# Patient Record
Sex: MALE | Race: WHITE
[De-identification: names, ages, dates, MRNs, and addresses within clinical notes are randomized per-mention and may not be internally consistent; named-entity substitution may affect disease eponyms.]

---

## 2019-11-05 ENCOUNTER — HOSPITAL ENCOUNTER (INPATIENT)
Dept: HOSPITAL 1 - ED | Age: 74
LOS: 7 days | Discharge: NURSING, REHAB OR LONG-TERM CARE | DRG: 917 | End: 2019-11-12
Attending: INTERNAL MEDICINE | Admitting: INTERNAL MEDICINE
Payer: OTHER GOVERNMENT

## 2019-11-05 VITALS — SYSTOLIC BLOOD PRESSURE: 99 MMHG | DIASTOLIC BLOOD PRESSURE: 60 MMHG

## 2019-11-05 VITALS — BODY MASS INDEX: 27.28 KG/M2 | WEIGHT: 180 LBS | HEIGHT: 68 IN

## 2019-11-05 DIAGNOSIS — F43.10: ICD-10-CM

## 2019-11-05 DIAGNOSIS — F29: ICD-10-CM

## 2019-11-05 DIAGNOSIS — M62.82: ICD-10-CM

## 2019-11-05 DIAGNOSIS — G92: ICD-10-CM

## 2019-11-05 DIAGNOSIS — T50.911A: Primary | ICD-10-CM

## 2019-11-05 DIAGNOSIS — F03.91: ICD-10-CM

## 2019-11-05 LAB
ALBUMIN SERPL-MCNC: 3.4 G/DL (ref 3.4–5)
ALP SERPL-CCNC: 116 U/L (ref 46–116)
ALT SERPL-CCNC: 51 U/L (ref 16–63)
AMPHETAMINES UR QL SCN: (no result)
AST SERPL-CCNC: 42 U/L (ref 15–37)
BASOPHILS NFR BLD: 0.4 % (ref 0–2)
BILIRUB SERPL-MCNC: 0.42 MG/DL (ref 0.2–1)
BUN SERPL-MCNC: 16 MG/DL (ref 7–18)
CALCIUM SERPL-MCNC: 8.4 MG/DL (ref 8.5–10.1)
CHLORIDE SERPL-SCNC: 105 MMOL/L (ref 98–107)
CK MB SERPL-MCNC: 6.6 NG/ML (ref 0–3.6)
CK SERPL-CCNC: 348 U/L (ref 39–308)
CO2 SERPL-SCNC: 31.7 MMOL/L (ref 21–32)
CREAT SERPL-MCNC: 0.9 MG/DL (ref 0.7–1.3)
ERYTHROCYTE [DISTWIDTH] IN BLOOD BY AUTOMATED COUNT: 14.3 % (ref 11.5–14.5)
GFR SERPLBLD BASED ON 1.73 SQ M-ARVRAT: (no result) ML/MIN
GLUCOSE SERPL-MCNC: 112 MG/DL (ref 74–106)
MICROSCOPIC UR-IMP: NO
PLATELET # BLD: 179 X10^3MCL (ref 130–400)
POTASSIUM SERPL-SCNC: 3.9 MMOL/L (ref 3.5–5.1)
PROT SERPL-MCNC: 6.8 G/DL (ref 6.4–8.2)
RBC # UR STRIP.AUTO: NEGATIVE /UL
SODIUM SERPL-SCNC: 142 MMOL/L (ref 136–145)
UA SPECIFIC GRAVITY: 1.01 (ref 1–1.03)

## 2019-11-05 PROCEDURE — G0378 HOSPITAL OBSERVATION PER HR: HCPCS

## 2019-11-06 VITALS — SYSTOLIC BLOOD PRESSURE: 107 MMHG | DIASTOLIC BLOOD PRESSURE: 54 MMHG

## 2019-11-06 VITALS — DIASTOLIC BLOOD PRESSURE: 63 MMHG | SYSTOLIC BLOOD PRESSURE: 111 MMHG

## 2019-11-06 VITALS — SYSTOLIC BLOOD PRESSURE: 106 MMHG | DIASTOLIC BLOOD PRESSURE: 54 MMHG

## 2019-11-06 VITALS — SYSTOLIC BLOOD PRESSURE: 113 MMHG | DIASTOLIC BLOOD PRESSURE: 61 MMHG

## 2019-11-06 VITALS — DIASTOLIC BLOOD PRESSURE: 60 MMHG | SYSTOLIC BLOOD PRESSURE: 107 MMHG

## 2019-11-06 LAB
BASOPHILS NFR BLD: 0.6 % (ref 0–2)
BUN SERPL-MCNC: 12 MG/DL (ref 7–18)
CALCIUM SERPL-MCNC: 7.9 MG/DL (ref 8.5–10.1)
CHLORIDE SERPL-SCNC: 110 MMOL/L (ref 98–107)
CO2 SERPL-SCNC: 28.3 MMOL/L (ref 21–32)
CREAT SERPL-MCNC: 0.7 MG/DL (ref 0.7–1.3)
ERYTHROCYTE [DISTWIDTH] IN BLOOD BY AUTOMATED COUNT: 13.8 % (ref 11.5–14.5)
GFR SERPLBLD BASED ON 1.73 SQ M-ARVRAT: (no result) ML/MIN
GLUCOSE SERPL-MCNC: 107 MG/DL (ref 74–106)
MAGNESIUM SERPL-MCNC: 2.2 MG/DL (ref 1.8–2.4)
PHOSPHATE SERPL-MCNC: 2.6 MG/DL (ref 2.5–4.9)
PLATELET # BLD: 129 X10^3MCL (ref 130–400)
POTASSIUM SERPL-SCNC: 3.5 MMOL/L (ref 3.5–5.1)
SODIUM SERPL-SCNC: 144 MMOL/L (ref 136–145)

## 2019-11-07 VITALS — SYSTOLIC BLOOD PRESSURE: 91 MMHG | DIASTOLIC BLOOD PRESSURE: 47 MMHG

## 2019-11-07 VITALS — SYSTOLIC BLOOD PRESSURE: 100 MMHG | DIASTOLIC BLOOD PRESSURE: 57 MMHG

## 2019-11-07 VITALS — SYSTOLIC BLOOD PRESSURE: 122 MMHG | DIASTOLIC BLOOD PRESSURE: 60 MMHG

## 2019-11-07 VITALS — DIASTOLIC BLOOD PRESSURE: 58 MMHG | SYSTOLIC BLOOD PRESSURE: 118 MMHG

## 2019-11-07 VITALS — SYSTOLIC BLOOD PRESSURE: 112 MMHG | DIASTOLIC BLOOD PRESSURE: 56 MMHG

## 2019-11-07 VITALS — DIASTOLIC BLOOD PRESSURE: 56 MMHG | SYSTOLIC BLOOD PRESSURE: 109 MMHG

## 2019-11-07 LAB
BASOPHILS NFR BLD: 0.7 % (ref 0–2)
BUN SERPL-MCNC: 14 MG/DL (ref 7–18)
CALCIUM SERPL-MCNC: 7.9 MG/DL (ref 8.5–10.1)
CHLORIDE SERPL-SCNC: 109 MMOL/L (ref 98–107)
CO2 SERPL-SCNC: 26.9 MMOL/L (ref 21–32)
CREAT SERPL-MCNC: 0.8 MG/DL (ref 0.7–1.3)
ERYTHROCYTE [DISTWIDTH] IN BLOOD BY AUTOMATED COUNT: 13.7 % (ref 11.5–14.5)
GFR SERPLBLD BASED ON 1.73 SQ M-ARVRAT: (no result) ML/MIN
GLUCOSE SERPL-MCNC: 85 MG/DL (ref 74–106)
MAGNESIUM SERPL-MCNC: 2.1 MG/DL (ref 1.8–2.4)
PHOSPHATE SERPL-MCNC: 3.2 MG/DL (ref 2.5–4.9)
PLATELET # BLD: 128 X10^3MCL (ref 130–400)
POTASSIUM SERPL-SCNC: 3.2 MMOL/L (ref 3.5–5.1)
SODIUM SERPL-SCNC: 147 MMOL/L (ref 136–145)

## 2019-11-08 VITALS — SYSTOLIC BLOOD PRESSURE: 118 MMHG | DIASTOLIC BLOOD PRESSURE: 63 MMHG

## 2019-11-08 VITALS — SYSTOLIC BLOOD PRESSURE: 122 MMHG | DIASTOLIC BLOOD PRESSURE: 75 MMHG

## 2019-11-08 VITALS — DIASTOLIC BLOOD PRESSURE: 70 MMHG | SYSTOLIC BLOOD PRESSURE: 136 MMHG

## 2019-11-08 VITALS — DIASTOLIC BLOOD PRESSURE: 68 MMHG | SYSTOLIC BLOOD PRESSURE: 105 MMHG

## 2019-11-08 LAB
ALBUMIN SERPL-MCNC: 2.6 G/DL (ref 3.4–5)
ALP SERPL-CCNC: 91 U/L (ref 46–116)
ALT SERPL-CCNC: 38 U/L (ref 16–63)
AST SERPL-CCNC: 54 U/L (ref 15–37)
BASOPHILS NFR BLD: 0.5 % (ref 0–2)
BILIRUB SERPL-MCNC: 0.7 MG/DL (ref 0.2–1)
BUN SERPL-MCNC: 13 MG/DL (ref 7–18)
CALCIUM SERPL-MCNC: 7.6 MG/DL (ref 8.5–10.1)
CHLORIDE SERPL-SCNC: 108 MMOL/L (ref 98–107)
CO2 SERPL-SCNC: 29.2 MMOL/L (ref 21–32)
CREAT SERPL-MCNC: 0.8 MG/DL (ref 0.7–1.3)
ERYTHROCYTE [DISTWIDTH] IN BLOOD BY AUTOMATED COUNT: 13.8 % (ref 11.5–14.5)
GFR SERPLBLD BASED ON 1.73 SQ M-ARVRAT: (no result) ML/MIN
GLUCOSE SERPL-MCNC: 98 MG/DL (ref 74–106)
MAGNESIUM SERPL-MCNC: 2 MG/DL (ref 1.8–2.4)
PHOSPHATE SERPL-MCNC: 3.6 MG/DL (ref 2.5–4.9)
PLATELET # BLD: 135 X10^3MCL (ref 130–400)
POTASSIUM SERPL-SCNC: 3.6 MMOL/L (ref 3.5–5.1)
PROT SERPL-MCNC: 5.3 G/DL (ref 6.4–8.2)
SODIUM SERPL-SCNC: 144 MMOL/L (ref 136–145)

## 2019-11-09 VITALS — DIASTOLIC BLOOD PRESSURE: 57 MMHG | SYSTOLIC BLOOD PRESSURE: 112 MMHG

## 2019-11-09 VITALS — DIASTOLIC BLOOD PRESSURE: 57 MMHG | SYSTOLIC BLOOD PRESSURE: 109 MMHG

## 2019-11-09 VITALS — SYSTOLIC BLOOD PRESSURE: 120 MMHG | DIASTOLIC BLOOD PRESSURE: 66 MMHG

## 2019-11-09 VITALS — SYSTOLIC BLOOD PRESSURE: 135 MMHG | DIASTOLIC BLOOD PRESSURE: 63 MMHG

## 2019-11-09 LAB
BASOPHILS NFR BLD: 0.5 % (ref 0–2)
BUN SERPL-MCNC: 7 MG/DL (ref 7–18)
CALCIUM SERPL-MCNC: 7.9 MG/DL (ref 8.5–10.1)
CHLORIDE SERPL-SCNC: 108 MMOL/L (ref 98–107)
CO2 SERPL-SCNC: 27.6 MMOL/L (ref 21–32)
CREAT SERPL-MCNC: 0.7 MG/DL (ref 0.7–1.3)
ERYTHROCYTE [DISTWIDTH] IN BLOOD BY AUTOMATED COUNT: 13.6 % (ref 11.5–14.5)
GFR SERPLBLD BASED ON 1.73 SQ M-ARVRAT: (no result) ML/MIN
GLUCOSE SERPL-MCNC: 105 MG/DL (ref 74–106)
PLATELET # BLD: 124 X10^3MCL (ref 130–400)
POTASSIUM SERPL-SCNC: 3.1 MMOL/L (ref 3.5–5.1)
SODIUM SERPL-SCNC: 146 MMOL/L (ref 136–145)

## 2019-11-10 VITALS — SYSTOLIC BLOOD PRESSURE: 116 MMHG | DIASTOLIC BLOOD PRESSURE: 72 MMHG

## 2019-11-10 VITALS — DIASTOLIC BLOOD PRESSURE: 72 MMHG | SYSTOLIC BLOOD PRESSURE: 116 MMHG

## 2019-11-10 VITALS — DIASTOLIC BLOOD PRESSURE: 71 MMHG | SYSTOLIC BLOOD PRESSURE: 138 MMHG

## 2019-11-10 VITALS — SYSTOLIC BLOOD PRESSURE: 116 MMHG | DIASTOLIC BLOOD PRESSURE: 59 MMHG

## 2019-11-10 LAB
BASOPHILS NFR BLD: 0.6 % (ref 0–2)
BUN SERPL-MCNC: 10 MG/DL (ref 7–18)
CALCIUM SERPL-MCNC: 8 MG/DL (ref 8.5–10.1)
CHLORIDE SERPL-SCNC: 106 MMOL/L (ref 98–107)
CO2 SERPL-SCNC: 30.5 MMOL/L (ref 21–32)
CREAT SERPL-MCNC: 0.8 MG/DL (ref 0.7–1.3)
ERYTHROCYTE [DISTWIDTH] IN BLOOD BY AUTOMATED COUNT: 13.8 % (ref 11.5–14.5)
GFR SERPLBLD BASED ON 1.73 SQ M-ARVRAT: (no result) ML/MIN
GLUCOSE SERPL-MCNC: 85 MG/DL (ref 74–106)
PLATELET # BLD: 133 X10^3MCL (ref 130–400)
POTASSIUM SERPL-SCNC: 3.5 MMOL/L (ref 3.5–5.1)
SODIUM SERPL-SCNC: 143 MMOL/L (ref 136–145)

## 2019-11-11 VITALS — DIASTOLIC BLOOD PRESSURE: 64 MMHG | SYSTOLIC BLOOD PRESSURE: 98 MMHG

## 2019-11-11 VITALS — SYSTOLIC BLOOD PRESSURE: 123 MMHG | DIASTOLIC BLOOD PRESSURE: 71 MMHG

## 2019-11-11 VITALS — SYSTOLIC BLOOD PRESSURE: 123 MMHG | DIASTOLIC BLOOD PRESSURE: 70 MMHG

## 2019-11-11 VITALS — SYSTOLIC BLOOD PRESSURE: 95 MMHG | DIASTOLIC BLOOD PRESSURE: 52 MMHG

## 2019-11-12 VITALS — SYSTOLIC BLOOD PRESSURE: 126 MMHG | DIASTOLIC BLOOD PRESSURE: 78 MMHG

## 2019-11-12 VITALS — DIASTOLIC BLOOD PRESSURE: 60 MMHG | SYSTOLIC BLOOD PRESSURE: 122 MMHG

## 2019-11-12 VITALS — SYSTOLIC BLOOD PRESSURE: 105 MMHG | DIASTOLIC BLOOD PRESSURE: 62 MMHG

## 2019-11-12 LAB
BASOPHILS NFR BLD: 0.9 % (ref 0–2)
BUN SERPL-MCNC: 14 MG/DL (ref 7–18)
CALCIUM SERPL-MCNC: 8.4 MG/DL (ref 8.5–10.1)
CHLORIDE SERPL-SCNC: 104 MMOL/L (ref 98–107)
CO2 SERPL-SCNC: 30 MMOL/L (ref 21–32)
CREAT SERPL-MCNC: 0.8 MG/DL (ref 0.7–1.3)
ERYTHROCYTE [DISTWIDTH] IN BLOOD BY AUTOMATED COUNT: 13.3 % (ref 11.5–14.5)
GFR SERPLBLD BASED ON 1.73 SQ M-ARVRAT: (no result) ML/MIN
GLUCOSE SERPL-MCNC: 94 MG/DL (ref 74–106)
PLATELET # BLD: 152 X10^3MCL (ref 130–400)
POTASSIUM SERPL-SCNC: 3.7 MMOL/L (ref 3.5–5.1)
SODIUM SERPL-SCNC: 142 MMOL/L (ref 136–145)

## 2020-04-05 ENCOUNTER — HOSPITAL ENCOUNTER (INPATIENT)
Dept: HOSPITAL 1 - ED | Age: 75
LOS: 12 days | Discharge: HOSPICE | DRG: 884 | End: 2020-04-17
Attending: HOSPITALIST | Admitting: HOSPITALIST
Payer: OTHER GOVERNMENT

## 2020-04-05 VITALS
HEIGHT: 70 IN | BODY MASS INDEX: 32.93 KG/M2 | WEIGHT: 230 LBS | BODY MASS INDEX: 32.93 KG/M2 | HEIGHT: 70 IN | WEIGHT: 230 LBS

## 2020-04-05 DIAGNOSIS — N17.0: ICD-10-CM

## 2020-04-05 DIAGNOSIS — Z79.82: ICD-10-CM

## 2020-04-05 DIAGNOSIS — M62.82: ICD-10-CM

## 2020-04-05 DIAGNOSIS — Z79.1: ICD-10-CM

## 2020-04-05 DIAGNOSIS — F03.91: Primary | ICD-10-CM

## 2020-04-05 DIAGNOSIS — F05: ICD-10-CM

## 2020-04-05 DIAGNOSIS — F29: ICD-10-CM

## 2020-04-05 DIAGNOSIS — E87.6: ICD-10-CM

## 2020-04-05 LAB
ALBUMIN SERPL-MCNC: 3.4 G/DL (ref 3.4–5)
ALP SERPL-CCNC: 80 U/L (ref 46–116)
ALT SERPL-CCNC: 27 U/L (ref 16–63)
AMPHETAMINES UR QL SCN: (no result)
AST SERPL-CCNC: 28 U/L (ref 15–37)
BASOPHILS NFR BLD: 0.8 % (ref 0–2)
BILIRUB SERPL-MCNC: 0.4 MG/DL (ref 0.2–1)
BUN SERPL-MCNC: 19 MG/DL (ref 7–18)
CALCIUM SERPL-MCNC: 8.8 MG/DL (ref 8.5–10.1)
CHLORIDE SERPL-SCNC: 105 MMOL/L (ref 98–107)
CO2 SERPL-SCNC: 29 MMOL/L (ref 21–32)
CREAT SERPL-MCNC: 1 MG/DL (ref 0.7–1.3)
ERYTHROCYTE [DISTWIDTH] IN BLOOD BY AUTOMATED COUNT: 13.6 % (ref 11.5–14.5)
GFR SERPLBLD BASED ON 1.73 SQ M-ARVRAT: (no result) ML/MIN
GLUCOSE SERPL-MCNC: 104 MG/DL (ref 74–106)
LIPASE SERPL-CCNC: 122 IU/L (ref 73–393)
MICROSCOPIC UR-IMP: YES
PLATELET # BLD: 184 X10^3MCL (ref 130–400)
POTASSIUM SERPL-SCNC: 3.3 MMOL/L (ref 3.5–5.1)
PROT SERPL-MCNC: 6.3 G/DL (ref 6.4–8.2)
RBC # UR STRIP.AUTO: (no result) /UL
SODIUM SERPL-SCNC: 144 MMOL/L (ref 136–145)
UA SPECIFIC GRAVITY: >=1.03 (ref 1–1.03)

## 2020-04-05 PROCEDURE — G0378 HOSPITAL OBSERVATION PER HR: HCPCS

## 2020-04-05 PROCEDURE — G0480 DRUG TEST DEF 1-7 CLASSES: HCPCS

## 2020-04-05 NOTE — NUR
PT RESTING IN BED IN A POSITION OF COMFORT. RESPIRATIONS EVEN AND UNLABORED, NO
S/S OF DISTRESS NOTED.

## 2020-04-05 NOTE — NUR
SPOKE TO PTS SONKAVYA. SON REPORTS THAT TWO WEEKS AGO PT HAD AN ADJUSTMENT TO
HIS MEDICATIONS WHICH INCREASED DOSAGE DUE TO AGITATION AND COMBATIVNESS WITH
STAFF MEMBERS. SINCE MEDICATION ADJUSTMENT PT SEEMS MORE "OUT OF IT" PER SON.
SON, KAVYA PHONE NUMBER- [phone]
PLS CALL WITH UPDATES.

## 2020-04-05 NOTE — NUR
PT BIB [facility] WITH C/O "INCREASED AGITATION" PER NURSING FACILITY
STAFF. PER MEDICS PT LIVES ON A DEMENTIA UNIT AND OVER THE LAST TWO DAYS PT HAS
EXPERIENCED AGITATION AND AGGRESSION TOWARDS STAFF MEMBERS WHICH IS UNUSUAL FOR
PT BEHAVIOR. PT HAS A HX OF ADVANCED DEMENTIA WITH A BASELINE MENTATION OF
A&OX1. PT ALSO HAS PTSD. PD ON SCENE AND PER MEDICS PT COOPERATIVE WITH ALL EMS
STAFF. PT IS CURRENTLY RESTING IN A POSITION OF COMFORT, COOPERATIVE, AND CALM.
PT PLACED ON FULL CARDIAC MONITOR, A&OX1, NO DISTRESS NOTED. PT IN VIEW OF
NURSES STATION FOR SAFETY. DR RYAN AT BEDSIDE FOR MSE.

## 2020-04-06 VITALS — DIASTOLIC BLOOD PRESSURE: 67 MMHG | SYSTOLIC BLOOD PRESSURE: 122 MMHG

## 2020-04-06 VITALS — DIASTOLIC BLOOD PRESSURE: 89 MMHG | SYSTOLIC BLOOD PRESSURE: 118 MMHG

## 2020-04-06 VITALS — DIASTOLIC BLOOD PRESSURE: 65 MMHG | SYSTOLIC BLOOD PRESSURE: 99 MMHG

## 2020-04-06 LAB
BASOPHILS NFR BLD: 0.6 % (ref 0–2)
BUN SERPL-MCNC: 15 MG/DL (ref 7–18)
CALCIUM SERPL-MCNC: 8.4 MG/DL (ref 8.5–10.1)
CHLORIDE SERPL-SCNC: 106 MMOL/L (ref 98–107)
CHOLEST SERPL-MCNC: 149 MG/DL (ref ?–200)
CHOLEST/HDLC SERPL: 2.2 MG/DL
CO2 SERPL-SCNC: 26.6 MMOL/L (ref 21–32)
CREAT SERPL-MCNC: 0.9 MG/DL (ref 0.7–1.3)
ERYTHROCYTE [DISTWIDTH] IN BLOOD BY AUTOMATED COUNT: 13.5 % (ref 11.5–14.5)
GFR SERPLBLD BASED ON 1.73 SQ M-ARVRAT: (no result) ML/MIN
GLUCOSE SERPL-MCNC: 102 MG/DL (ref 74–106)
HDLC SERPL-MCNC: 67 MG/DL (ref 40–60)
MAGNESIUM SERPL-MCNC: 2.1 MG/DL (ref 1.8–2.4)
PHOSPHATE SERPL-MCNC: 2.5 MG/DL (ref 2.5–4.9)
PLATELET # BLD: 122 X10^3MCL (ref 130–400)
POTASSIUM SERPL-SCNC: 3.2 MMOL/L (ref 3.5–5.1)
SODIUM SERPL-SCNC: 144 MMOL/L (ref 136–145)
TRIGL SERPL-MCNC: 34 MG/DL (ref ?–150)

## 2020-04-06 NOTE — NUR
PT IN BED WITH 1:1 SITTER AT BEDSIDE. PT IS STILL REFUSING TELE, TELE #16. PT
REMAINS UNCOOPERATIVE. EVEN AND UNLABORED BREATHING, ROOM AIR. FALL
PRECAUTIONS IN PLACE, ASPIRATION PRECAUTIONS IN PLACE. IV SALINE LOCK, RAC
20G. BED IN LOWEST POSITION, CALL LIGHT WITHIN REACH. WILL CONTINUE TO
MONITOR.

## 2020-04-06 NOTE — NUR
PT RESTING WITH EYES CLOSED. EASILY AROUSABLE WITH VERBAL STIMULI. CONFUSED.
WOULD BE DEFENSIVE AND THEN GO BACK TO SLEEP. MIREYA SOFT WRIST RESTRAINTS NOTED.
PT TOLERATED IT WELL. DENIES DIZZINESS AND HEADACHE. BREATH SOUNDS CLEAR.
BREATHING EVEN AND UNLABORED ON ROOM AIR. DENIES CHEST PAIN AND PRESSURE.
BOWEL SOUNDS ACTIVE. NO C/O N/V AND ABD PAIN. IV SALINE LOCK INTACT ON THE
RAC. MADE PT COMFORTABLE. PLACED CALL LIGHT WITH IN REACH. WILL CONTINUE TO
MONITOR.

## 2020-04-06 NOTE — NUR
AT 0715 - RECEIVED PATIENT FROM NIGHT NURSE. AWAKE, ALERT AND CONFUSED.
UNCOOPERATIVE, RESISTIVE TO TREATMENT AND COMBATIVE. SITTER IN ROOM WITH
PATIENT. PATIENT IS IN MIREYA WRIST RESTRAINTS FOR RISK OF INJURY TO SELF AND
OTHERS.
AT 0930 - PATIENT TOOK DEPAKOTE PO BUT AFTER THAT REFUSED ALL OTHER MEDS. HE
WAS SPITTING THEM OUT OF THE MOUTH.
AT 0950 - COMMENCED IV 20 MEQ K-RIDER. PATIENT BED LINEN CHANGED WITH 3 PERSON
ASSIST. REPOSITIONED PATIENT, BUT PATIENT HANGS LEGS OUT OF BED AND IS IN WHAT
APPEARS TO BE AN UNCOMFORTABLE POSITION MOST OF THE TIME. VERY RESISTIVE TO
 ALL CARE.
AT 1015 - PATIENT'S SON PER PHONE. UPDATED ON PATIENT CONDITION. SON IS AWARE
OF PATIENT'S MENTAL STATUS AND UNCOOPERATIVE AND COMBATIVE BEHAVIOUR.
AT 1145 - SEEN BY DR ALAN AND DR MUSTAFA DURING MORNING ROUNDS. DR MUSTAFA WILL
ORDER  IV OR IM MEDS INSTEAD OF PO.
CARE

## 2020-04-06 NOTE — NUR
RECEIVED CALL FROM NURSE CIUVEDA FROM PATIENT'S [facility]AC[facility]ITY. UPDATED. NO NEW
INFORMATION REGARDING TREATMENT IS AVAILABLE AT THIS TIME.

## 2020-04-06 NOTE — NUR
PT PULLING AT IV, TELE MONITOR, AND CONTINUOUSLY ATTEMPTING TO GET OUT OF BED.
PT UNCOOPERATIVE AND AGGRESSIVE AT THIS TIME. PLACED PT IN SOFT WRIST
RESTRAINTS TO B/L WRISTS. SITTER AT BEDSIDE.  ORDER FOR RESTRAINTS
RECEIVED. WILL CONTINUE TO MONITOR.

## 2020-04-06 NOTE — NUR
RECEIVED CALL FROM PATIENT'S SON. UPDATED ON CONDITION. PATIENT APPEARS A
LITTLE LESS COMBATIVE THIS AFTERNOON. HAS TAKEN RESPIDAL AS PER EMAR.

## 2020-04-06 NOTE — NUR
AT 1453 - GIVEN DOSE OF GEODON IM AS PER EMAR. PATIENT REMAINS COMBATIVE
DURING ATTEMPTS AT ANY CARE.
AT 1555 - REMAINS IN BED. AWAKE, CONFUSED. TALKS TO HIMSELF. OBSERVING PATIENT
CLOSELY.

## 2020-04-06 NOTE — NUR
PATIENT HAS EATEN SOME DINNER. STIL DIFFICULT TO POSITION CORRECTLY IN BED.
PATIENT REPEATEDLY GETS INTO A DIAGONAL POSITION DESPITE ALL EFFORTS OF
CORRECTION.

## 2020-04-06 NOTE — NUR
RECEIVED PT FROM ED NURSE. PT AWAKE, DISORIENTED, ATTEMPTING TO LEAVE.
UNCOOPERATIVE WITH CARE.  DENIES CP/PRESSURE. NO SOB/DIFFICULTY BREATHING
NOTED. PT AMBULATORY. IV TO RAC, INTACT AND PATENT. BED IN LOWEST POSITION.
CALL LIGHT WITHIN REACH. SITTER AT BEDSIDE.

## 2020-04-06 NOTE — NUR
PT TRANSFERED TO TELEMTRY ROOM 250B VIA WHEELCHAIR WITH SHELLIE HOOK AND LILLIAN
RN. PT AMBULATED WITH STEADY GAIT FROM WHEELCHAIR TO BED. PT STARTED TO
BECOME MILDLY AGITATED IN ROOM 250B. PREETHI CARBAJAL AT BEDSIDE, BEN AVELAR AT
BEDSIDE. PT AWAKE AND ALERT AT THIS TIME. BREATHING EVEN AND UNLABORED. STAFF
AT BEDSIDE ASSISTING PT AT THIS TIME.

## 2020-04-07 VITALS — SYSTOLIC BLOOD PRESSURE: 131 MMHG | DIASTOLIC BLOOD PRESSURE: 82 MMHG

## 2020-04-07 VITALS — SYSTOLIC BLOOD PRESSURE: 136 MMHG | DIASTOLIC BLOOD PRESSURE: 87 MMHG

## 2020-04-07 VITALS — DIASTOLIC BLOOD PRESSURE: 77 MMHG | SYSTOLIC BLOOD PRESSURE: 135 MMHG

## 2020-04-07 VITALS — SYSTOLIC BLOOD PRESSURE: 124 MMHG | DIASTOLIC BLOOD PRESSURE: 77 MMHG

## 2020-04-07 VITALS — DIASTOLIC BLOOD PRESSURE: 67 MMHG | SYSTOLIC BLOOD PRESSURE: 114 MMHG

## 2020-04-07 LAB
ALBUMIN SERPL-MCNC: 3.2 G/DL (ref 3.4–5)
ALP SERPL-CCNC: 85 U/L (ref 46–116)
ALT SERPL-CCNC: 30 U/L (ref 16–63)
AST SERPL-CCNC: 60 U/L (ref 15–37)
BASOPHILS NFR BLD: 0.5 % (ref 0–2)
BILIRUB SERPL-MCNC: 0.75 MG/DL (ref 0.2–1)
BUN SERPL-MCNC: 13 MG/DL (ref 7–18)
CALCIUM SERPL-MCNC: 8.5 MG/DL (ref 8.5–10.1)
CHLORIDE SERPL-SCNC: 104 MMOL/L (ref 98–107)
CO2 SERPL-SCNC: 32.2 MMOL/L (ref 21–32)
CREAT SERPL-MCNC: 1 MG/DL (ref 0.7–1.3)
ERYTHROCYTE [DISTWIDTH] IN BLOOD BY AUTOMATED COUNT: 13.8 % (ref 11.5–14.5)
GFR SERPLBLD BASED ON 1.73 SQ M-ARVRAT: (no result) ML/MIN
GLUCOSE SERPL-MCNC: 130 MG/DL (ref 74–106)
MAGNESIUM SERPL-MCNC: 2.1 MG/DL (ref 1.8–2.4)
PHOSPHATE SERPL-MCNC: 3.2 MG/DL (ref 2.5–4.9)
PLATELET # BLD: 203 X10^3MCL (ref 130–400)
POTASSIUM SERPL-SCNC: 3.6 MMOL/L (ref 3.5–5.1)
PROT SERPL-MCNC: 6.3 G/DL (ref 6.4–8.2)
SODIUM SERPL-SCNC: 143 MMOL/L (ref 136–145)

## 2020-04-07 NOTE — NUR
PT RESTING WITH EYES CLOSED. OCCASIONALLY WAKES UP AND PULLS THE TELE MONITOR.
MADE PT COMFORTABLE. WILL CONTINUE TO MONITOR.

## 2020-04-07 NOTE — NUR
PATIENT LAYING IN BED ALERT BUT NOT ORIENTED WITH BILATERAL WRIST RESTRAINTS
IN PLACE, CAP REFILL < 3 SECONDS, RADIAL PULSES STRONG, SKIN INTACT, PATIENT
INCONTINENT OF BLADDER AND BOWEL, BOWEL SOUNDS ACTIVE, PATIENT UNBALE TO
AMBULATE SAFELY DUE TO CONFUSION, LUNG SOUNDS CLEAR ON ROOM AIR, ALL OHER
NEEDS BEING MET AT THIS TIME.

## 2020-04-07 NOTE — NUR
PT IS ALERT TO SELF, CONFUSED AND UNABLE TO FOLLOW COMMANDS. TELE #16, NSR.
NO SIGN OF DISTRESS NOTED. PULSES ARE PRESENT. NO EDEMA NOTED. PT ON MIREYA SOFT
WRIST RESTRAINTS, PT IS KICKING, RESTLESS AND REMOVE LINES. MD ORDER IN FOR
RESTRAINTS IN CHART. LUNGS CLEAR IN ALL FEILDS. ON RA, NO SIGN OF RESP
DISTRESS. BOWEL SOUNDS ARE PRSENT. INCONTINENT WITH URINE. SKIN WARM AND
INTACT. NO SIGNS OF PAIN NOTED. IV ON RAC, SALINE LOCK INTACT AND SECURED. BED
IS AT LOWEST SETTING. CALL LIGHT WITHIN REACH. WILL CONTINUE TO MONITOR.

## 2020-04-07 NOTE — NUR
MD BONDS MADE AWARE OF VALPROIC ACID RESULTS AND ABOUT MD MORA WANTED A
HIGHER DOSE OF DEAKENE. NO CHRAGE IN DOSE RECIEVED. PER MD BONDS GIVEN
SCHEDULED DOSE AS IS TONIGHT.

## 2020-04-07 NOTE — NUR
PATIENT LAYING IN BED ALERT AND ORIENTED, SKIN INTACT NO C/O PAIN,LUNG SOUNDS
CLEAR ON RA, BOWLE SOUNDS ACTIVE, NO EDEMA NOTED, PEDAL PULSES STRONG AND
EVEN, SALIN LOCK ON RIGHT HAND INTACT, PATIENT ABLE TO AMBULATE AND MOVE
EXTREMETIES FREELY WITH NO C/O WEAKNESS PATIENT PLEASANT AND COOPERATIVE.

## 2020-04-07 NOTE — NUR
PT QUIET BUT OCCASIONALLY TRYING TO GET OUT OF BED AND KICK PEOPLE COMING NEAR
HIS BED. MIREYA WRIST RESTRAINTS STILL ON. PT TOLERATING IT WELL. WILL ENDORSE TO
THE AM NURSE ACCORDINGLY.

## 2020-04-07 NOTE — NUR
PATIENT LAYING IN BED ASLEEP WITH NO S/SX OF PAIN OR DISCOMFORT AT THIS TIME.
WILL ENDORSE CARE TO PM NURSE

## 2020-04-08 VITALS — SYSTOLIC BLOOD PRESSURE: 116 MMHG | DIASTOLIC BLOOD PRESSURE: 68 MMHG

## 2020-04-08 VITALS — SYSTOLIC BLOOD PRESSURE: 129 MMHG | DIASTOLIC BLOOD PRESSURE: 73 MMHG

## 2020-04-08 VITALS — SYSTOLIC BLOOD PRESSURE: 116 MMHG | DIASTOLIC BLOOD PRESSURE: 52 MMHG

## 2020-04-08 VITALS — SYSTOLIC BLOOD PRESSURE: 122 MMHG | DIASTOLIC BLOOD PRESSURE: 67 MMHG

## 2020-04-08 VITALS — DIASTOLIC BLOOD PRESSURE: 64 MMHG | SYSTOLIC BLOOD PRESSURE: 118 MMHG

## 2020-04-08 VITALS — SYSTOLIC BLOOD PRESSURE: 116 MMHG | DIASTOLIC BLOOD PRESSURE: 66 MMHG

## 2020-04-08 LAB
BASOPHILS NFR BLD: 0.5 % (ref 0–2)
BUN SERPL-MCNC: 12 MG/DL (ref 7–18)
CALCIUM SERPL-MCNC: 8.8 MG/DL (ref 8.5–10.1)
CHLORIDE SERPL-SCNC: 105 MMOL/L (ref 98–107)
CO2 SERPL-SCNC: 30.3 MMOL/L (ref 21–32)
CREAT SERPL-MCNC: 0.9 MG/DL (ref 0.7–1.3)
ERYTHROCYTE [DISTWIDTH] IN BLOOD BY AUTOMATED COUNT: 13.6 % (ref 11.5–14.5)
GFR SERPLBLD BASED ON 1.73 SQ M-ARVRAT: (no result) ML/MIN
GLUCOSE SERPL-MCNC: 97 MG/DL (ref 74–106)
PLATELET # BLD: 124 X10^3MCL (ref 130–400)
POTASSIUM SERPL-SCNC: 4 MMOL/L (ref 3.5–5.1)
SODIUM SERPL-SCNC: 144 MMOL/L (ref 136–145)

## 2020-04-08 NOTE — NUR
PT AGRESSIVE THIS AM. TRYING TO HIT CNA DURING ADLS FOR THE AM. PT CALMED DOWN
ENOUGH TO PREFORM ADLS. ON MIREYA SOFT WRIST RESTRAINTS, RENEWED ORDER IN PT
CHART. BED IS AT LOWEST SETTING. CALL LIGHT WITHIN REACH. WILL ENDORSE TO AM
NURSE.

## 2020-04-08 NOTE — NUR
PT IS STILL KICKING HIS LEGS IN BED. PT RESTS IN INTERVALS. SOFT RESTRAINTS ON
MIREYA WRISTS. ATTEMPTED TO REPOSITIONED PT BUT PT KEPT MOVING INTO A DIAGONAL
POSITION. BED IS AT LOWEST SETTING. CALL LIGHT WITHIN REACH. WILL CONTINUE TO
MONTIOR.

## 2020-04-08 NOTE — NUR
ROUTINE MEDICATIONS ADMINISTERED AND TOLERATED WELL. NO ACUTE DISTRESS NOTED.
PT CONFUSED, AGITATED, RESTLESS AND KICKING. HALDOL IM ADMINISTERED
ACCORDINGLY PER MAR ORDER. SITTER AT BEDSIDE. RESTRAINT PROTOCOLS FOLLOWED.
WILL CONTINUE TO MONITOR.

## 2020-04-08 NOTE — NUR
PT RESTLESS, KICKING. RESTRAINT PROTOCOLS FOLLOWED. SITTER AT BEDSIDE. BED IN
LOWEST POSITION. CALL LIGHT WITHIN REACH. WILL CONTINUE MONITOR.

## 2020-04-08 NOTE — NUR
HAVING A REGULAR DIET BREAKFAST ASSISTED BY CNA NO ASPIRATION NOTED. AM
SCHEDULED MEDS GIVEN, TOLERATED WELL. SOME VERBAL RESPONDED BUT CONFUSED.

## 2020-04-08 NOTE — NUR
SEEN IN BED AWAKE, CONFUSED, ATTEMPTING TO GET OUT OF RESTRAINT, KICKING HIS
LEGS, HAS EYES CONTACT, NON VERBAL RESPONDED AT THIS TIME. ON TELE#16 NSR.
BREATHING E/U ON ROOM AIR. CLEAR LUNG SOUND BILATERALLY. INCONTINENCE URINE.
S/L TO RAC INTACT AND PATENT. ORIENTATION PROVIDED, NO INDICATION OF LEARNING
AT THIS TIME. SITTER 1:1 AT BEDSIDE FOR SAFETY. BED IN LOWEST POSITION AND
LOCKED. SIDERAILS UP X2.

## 2020-04-08 NOTE — NUR
RECEIVED PT LAYING IN BED WITH HOB ELEVATED. PT IS CONFUSED. SITTER AT
BEDSIDE FOR SAFETY. ON TELE #16 READING NSR. BREATHING IS EVEN AND UNLABORED
ON RA. LUNG SOUNDS CTA. NO RESP DISTRESS NOTED. INCONTINENT. WILL PROVIDE BENITO
CARE PRN AND REPOSITION Q2H. BILATERALLY SOFT RESTRAINTS APPLIED, WILL FOLLOW
RESTRAINT PROTOCOL. SKIN INTACT. IV TO RAC SL, PATENT AND INTACT. NO ERYTHEMA
NOTED. BED IN LOWEST POSITION. CALL LIGHT WITHIN REACH. BED ALARM ON. WILL
CONTINUE TO MONITOR.

## 2020-04-08 NOTE — NUR
AGITATED NOTED AT THIS TIME. FINISHED 50% OF DINNER. REFUSED TO TAKE RISPERDAL
AT THIS TIME. ATTEMPTED TO GET OFF MIREYA SOFT WRIST RESTRAINT. REMAINS CONFUSED.
REORIENTATION PROVIDED OFTEN. SITTER 1:1 AT BEDSIDE FOR SAFETY.

## 2020-04-09 VITALS — SYSTOLIC BLOOD PRESSURE: 117 MMHG | DIASTOLIC BLOOD PRESSURE: 66 MMHG

## 2020-04-09 VITALS — SYSTOLIC BLOOD PRESSURE: 119 MMHG | DIASTOLIC BLOOD PRESSURE: 63 MMHG

## 2020-04-09 VITALS — SYSTOLIC BLOOD PRESSURE: 129 MMHG | DIASTOLIC BLOOD PRESSURE: 60 MMHG

## 2020-04-09 VITALS — DIASTOLIC BLOOD PRESSURE: 69 MMHG | SYSTOLIC BLOOD PRESSURE: 121 MMHG

## 2020-04-09 VITALS — SYSTOLIC BLOOD PRESSURE: 114 MMHG | DIASTOLIC BLOOD PRESSURE: 68 MMHG

## 2020-04-09 LAB
BASOPHILS NFR BLD: 0.3 % (ref 0–2)
BUN SERPL-MCNC: 14 MG/DL (ref 7–18)
CALCIUM SERPL-MCNC: 8.3 MG/DL (ref 8.5–10.1)
CHLORIDE SERPL-SCNC: 103 MMOL/L (ref 98–107)
CO2 SERPL-SCNC: 30.3 MMOL/L (ref 21–32)
CREAT SERPL-MCNC: 0.8 MG/DL (ref 0.7–1.3)
ERYTHROCYTE [DISTWIDTH] IN BLOOD BY AUTOMATED COUNT: 13.5 % (ref 11.5–14.5)
GFR SERPLBLD BASED ON 1.73 SQ M-ARVRAT: (no result) ML/MIN
GLUCOSE SERPL-MCNC: 96 MG/DL (ref 74–106)
PLATELET # BLD: 157 X10^3MCL (ref 130–400)
POTASSIUM SERPL-SCNC: 3.8 MMOL/L (ref 3.5–5.1)
SODIUM SERPL-SCNC: 141 MMOL/L (ref 136–145)

## 2020-04-09 NOTE — NUR
RESTLESS, AGITATED, NOT FOLLOWING INSTRUCTIONS, ATTEMPTING TO CLIMB OUT OF
BED, MEDICATED WITH HALDOL AS PER EMAR. SITTER AT THE BEDSIDE. WILL CONTINUE
TO MONITOR.

## 2020-04-09 NOTE — NUR
[facility] NOC shift to continue actively looking for placement for this pt. Will
followup with potential facilities today. Will contact with any updates. No
openings per day shift report.

## 2020-04-09 NOTE — NUR
Received packet from  Rebecca.  Packet faxed to the following
facilities:
 
[facility]

## 2020-04-09 NOTE — NUR
SEEN PATIENT IN BED RESTING, AWAKE, CONFUSED. BREATHING E/U ON ROOM AIR. ON
TELE# 16 NSR. NO S/S OF PAIN. ON REGULAR DIET. INCONTINENCE URINE. S/L TO RAC
INTACT AND PATENT. SCD TO BLE INPLACE. MIREYA SOFT WRIST RESTRAINT WITH (+) CMS.
SITTER 1:1 AT BEDSIDE FOR SAFETY. BED LOCKED AND IN LOWEST POSITION.
REORIENTATION PROVIDED. SIDERAILS UP X2.

## 2020-04-09 NOTE — NUR
COMFORT AND SAFETY MEASURES MAINTAINED. ALL NEEDS ASSESSED AND ATTENDED TO.
WILL CONTINUE TO MONITOR AND ENDORSE CARE TO DAY SHIFT NURSE.

## 2020-04-09 NOTE — NUR
REPOSITIONED AND CHANGED PT. ONE SOFT BROWN BM. PT IS STILL AGITATED,
RESTLESS, KICKING, AND CONFUSED. UNSUCCESSFULLY ABLE TO REORIENT PT. SITTER AT
BEDSIDE. RESTRAINT PROTOCOLS FOLLOWED. WILL CONTINUE TO MONITOR.

## 2020-04-09 NOTE — NUR
NO ANY DISTRESS THROUGHOUT SHIFT. VSS. REMAINS CONFUSED. ANALIA. AGITATED AND
ATTEMPTING TO GET OUT OF BED OFTEN. MIREYA SOFT WRIST RESTRAINT CONTINUED PER
PROTOCAL. SEEN BY DOCTOR ULISSES. 5150 HOLD ORDER NOTED. SITTER 1:1 AT BEDSIDE.

## 2020-04-09 NOTE — NUR
RECEIVED PT IN BED, WITH SITTER AT THE BEDSIDE FOR SAFETY. ALERT. CONFUSED,
AGITATED AT TIMES. ON 5150 HOLD.RESP. EVEN AND UNLABORED. ON ROOM AIR, NO
ACUTE DISTRESS NOTED. MED-SURG. PT. DENIES CP OR ANY DISCOMFORT AT THIS TIME.
AFEBRILE AND VITAL SIGNS STABLE. H TO RAC, INTACT AND PATENT. WITH BILAT. SOFT
WRIST RESTRAINTS FOR SAFETY. WILL FOLLOW PROTOCOL. WILL CONTINUE TO MONITOR.

## 2020-04-10 VITALS — DIASTOLIC BLOOD PRESSURE: 77 MMHG | SYSTOLIC BLOOD PRESSURE: 119 MMHG

## 2020-04-10 VITALS — DIASTOLIC BLOOD PRESSURE: 61 MMHG | SYSTOLIC BLOOD PRESSURE: 102 MMHG

## 2020-04-10 VITALS — SYSTOLIC BLOOD PRESSURE: 113 MMHG | DIASTOLIC BLOOD PRESSURE: 65 MMHG

## 2020-04-10 VITALS — DIASTOLIC BLOOD PRESSURE: 65 MMHG | SYSTOLIC BLOOD PRESSURE: 113 MMHG

## 2020-04-10 VITALS — SYSTOLIC BLOOD PRESSURE: 107 MMHG | DIASTOLIC BLOOD PRESSURE: 66 MMHG

## 2020-04-10 VITALS — SYSTOLIC BLOOD PRESSURE: 118 MMHG | DIASTOLIC BLOOD PRESSURE: 60 MMHG

## 2020-04-10 LAB
BASOPHILS NFR BLD: 0.6 % (ref 0–2)
BUN SERPL-MCNC: 15 MG/DL (ref 7–18)
CALCIUM SERPL-MCNC: 9 MG/DL (ref 8.5–10.1)
CHLORIDE SERPL-SCNC: 103 MMOL/L (ref 98–107)
CO2 SERPL-SCNC: 30.3 MMOL/L (ref 21–32)
CREAT SERPL-MCNC: 0.9 MG/DL (ref 0.7–1.3)
ERYTHROCYTE [DISTWIDTH] IN BLOOD BY AUTOMATED COUNT: 13.7 % (ref 11.5–14.5)
GFR SERPLBLD BASED ON 1.73 SQ M-ARVRAT: (no result) ML/MIN
GLUCOSE SERPL-MCNC: 91 MG/DL (ref 74–106)
PLATELET # BLD: 140 X10^3MCL (ref 130–400)
POTASSIUM SERPL-SCNC: 3.7 MMOL/L (ref 3.5–5.1)
SODIUM SERPL-SCNC: 141 MMOL/L (ref 136–145)

## 2020-04-10 NOTE — NUR
RESTLESS AT TIMES. EYES CLOSED AT THIS TIME, EASILY AROUSABLE. RESP. EVEN AND
UNLABORED. NO ACUTE DISTRESS NOTED. BILAT. SOFT WRIST RESTRAINTS IN PLACE FOR
SAFETY.  ABLE TO MOVE EXTS. SITTER AT THE BEDSIDE. WILL CONTINUE TO MONITOR.

## 2020-04-10 NOTE — NUR
PT ATTEMPTING TO PULL AT IV, REWRAPPED IV AND PT LEAVING ALONE, SITTER AT
BEDSIDE, SAFETY PRECAUTIONS IN PLACE, WILL MONITOR.

## 2020-04-10 NOTE — NUR
[facility]. Contacted [facility] and
[facility], will followup after shift change. Packet has been faxed for
waitlist.

## 2020-04-10 NOTE — NUR
SITTER REPORTS THAT PATIENT IS BECOMING MORE CONFUSED, HOWEVER, THE PATIENT IS
CALM. PATIENT TRIES TO GET OUT OF BED BUT REMAINS IN BED AT THIS TIME. WILL
CONTINUE TO MONITOR PATIENT.

## 2020-04-10 NOTE — NUR
AFEBRILE AND VITAL SIGNS STABLE. RESP. EVEN AND UNLABORED. ON ROOM AIR, NO
ACUTE DISTRESS NOTED. REMAINS ON 5150 HOLD. SITTER AT THE BEDSIDE FOR SAFETY.
WITH BILAT. SOFT WRIST RESTRAINTS FOR SAFETY, PREVENT PT FROM FALLING OUT OF
BED. INCONT. OF URINE, KEPT CLEAN AND DRY. WILL CONTINUE TO MONITOR.

## 2020-04-10 NOTE — NUR
PATIENT REMAINS FREE OF RESTRAINTS EVER SINCE 0932. PATIENT BECAME LESS
AGITATED AFTER HALDOL INJECTION. PATIENT REMAINS IN BED AT THIS TIME. WILL
CONTINUE TO MONITOR PATIENT'S BEHAVIOR.

## 2020-04-10 NOTE — NUR
PATIENT BEGAN TO BE MORE AGITATED AS HE WANDERED AROUND HIS ROOM, YET WAS NOT
AGGRESSIVE. ADMINISTER HALDOL IM IN HIS LEFT BUTTOCK. REDIRECTED PATIENT TO
HIS BED WHERE HE IS CURRENTLY RESTING AT THIS TIME. WILL CONTINUE TO MONITOR
PATIENT.

## 2020-04-10 NOTE — NUR
PATIENT UNDERSTOOD INSTRUCTIONS WITH PHYSICAL THERAPIST. THEREFORE, RESTRAINTS
WERE REMOVED AT THIS TIME. PATIENT SAT AT THE EDGE OF THE BED, AND PROCEEDED
TO STAND UP AND USE THE WALKER TO AMBULATE 10-15 FEET WITH PHYSICAL THERAPIST,
RN, AND CNA. HOWEVER, PATIENT HAD A MINOR EMOTIONAL BURST, AND WAS INSTRUCTED
TO RETURN TO HIS ROOM, IN WHICH THE PATIENT FOLLOWED INSTRUCTIONS. THE PATIENT
THEN ENTERED THE RESTROOM IN ORDER TO HAVE A BM. PATIENT PROCEEDED WITH VERBAL
ISNTRUCTION TO HIS BED. RESTRAINTS REMAIN OFF. NOTIFIED CHARGE RN. WILL
CONTINUE TO MONITOR THE PATIENT. PATIENT TOOK MEDICATIONS WILLINGLY. WILL
CONTINUE TO MONITOR THE NEED FOR RESTRAINTS.

## 2020-04-10 NOTE — NUR
RECIEVED PT RESTING IN BED WITH NO ACUTE DISTRESS NOTED AT THIS TIME, PT IS
A/OX1 TO PERSON, SPEAKS CLEAR, ABLE TO MAKE NEEDS KNOWN, PT DENIES PAIN OR SOB
AT THIS TIME, IV RO THE RFA, SITTER AT BEDSIDE, PT IS CALM AND COOPERATIVE
WITH CARE, SITTER AT BEDSIDE, ALL NEEDS ATTENDED TO SAFETY PRECAUTIONS IN
PLACE, WILL MONITOR

## 2020-04-10 NOTE — NUR
PATIENT IS AWAKE, CONFUSED, AND FORGETFUL AT TIMES, BUT NOT AGITATED. MEDSURG
PATIENT, DENIES CHEST PAIN. PERIPHERAL PULSES PALPABLE W/ NO SIGNS OF EDEMA.
LUNG SOUNDS CTA BILATERALLY, ON RA, O2 SAT 94%, DENIES SOB. NORMAOCTIVE BSX4,
ABD SOFT AND FLAT. INCONTINENT AT TIMES. GENERALIZED WEAKNESS AND REMAINS TO
HAVE RESTRAINTS ON. DECREASED STIMULI, ENSURED THAT RESTRAINTS ARE ON AND
VISUAL SAFETY CHECKS ARE PERFORMED. SITTER REMAINS PRESENT AT THIS TIME.
INFORMED PATIENT THAT I WILL BE GIVING MEDICATIONS SOON. PATIENT REMAINS FREE
FROM REMOVING RESTRAINTS, KICKING, HITTING AND REMOVING IV. PATIENT IS CALM
OTHERWISE. WILL CONTINUE TO MONITOR.

## 2020-04-11 VITALS — SYSTOLIC BLOOD PRESSURE: 134 MMHG | DIASTOLIC BLOOD PRESSURE: 73 MMHG

## 2020-04-11 VITALS — SYSTOLIC BLOOD PRESSURE: 141 MMHG | DIASTOLIC BLOOD PRESSURE: 79 MMHG

## 2020-04-11 VITALS — DIASTOLIC BLOOD PRESSURE: 64 MMHG | SYSTOLIC BLOOD PRESSURE: 130 MMHG

## 2020-04-11 VITALS — SYSTOLIC BLOOD PRESSURE: 119 MMHG | DIASTOLIC BLOOD PRESSURE: 81 MMHG

## 2020-04-11 NOTE — NUR
PT RESTED THROUGH THE NIGHT WITH NO ACUTE DISTRESS, PT TRIED TO PULL IV BUT
STOPED ONCE REDIRECTED AND IV WRAPPED FURTHER, ALL NEEDS ATTENDED TO SAFETTY
PRECAUTIONS REMAINED IN PLACE, SITTER AT BEDSIDE, WILL MONITOR AND ENDORSE
CARE

## 2020-04-11 NOTE — NUR
PATIENT IS CURRENTLY SLEEPING AT THIS TIME. DENIES ANY CHEST PAIN OR ANY PAIN
OR DISCOMFORT. PERIPHERAL PULSES PALPABLE W/ NO SIGNS OF EDEMA. NORMOACTIVE
BSX4, W/ ABD ROUND AND SOFT. VOIDS USING RESTROOM, BUT INCONTINENT AT TIMES.
IV SITE IS CDI. AWAITING FOR PLACEMENT AT PSYCH FACILITY. WILL CONTINUE TO
MONITOR PATIENT'S BEHAVIOR.

## 2020-04-11 NOTE — NUR
PATIENT IS CURRENTLY LYING IN BED IN THE SUPINE POSITION. PATIENT HAS ALREADY
EATEN HIS DINNER. PATIENT IS CALM AND COOPERATIVE AT THIS TIME. WILL CONTINUE
TO MONITOR.

## 2020-04-11 NOTE — NUR
PATIENT REMAINS IN BED LYING SUPINE WHILE WATCHING TELEVISION. REPORTED BY
CNA/EMY THAT PATIENT WANDERED TO THE PATIENT'S BED AND BEGAN TO BOTHER THE
PATIENT. HOWEVER, THE PATIENT WAS REDIRECTED BACK TO HIS BED AND HAS BEEN CALM
AND COOPERATIVE SINCE THEN. ALL NEEDS HAVE BEEN ADDRESSED AND MET. WILL
CONTINUE TO MONITOR THE PATIENT'S BEHAVIOR.

## 2020-04-11 NOTE — NUR
PT RESTING IN BED WITH NO ACUTE DISTRESS NOTED AND SITTER AT BEDSIDE. ALL
NEEDS ATTENDED TO AT THIS TIME, SAFETY PRECAUTIONS IN PLACE, WILL CONTINUE TO
MONITOR

## 2020-04-11 NOTE — NUR
RECEIVED PATIENT FROM DAY NURSE. MED/SURG PATIENT. AWAKE AND CONFUSED, UNABLE
TO ANSWER ORIENTATION QUESTIONS. ON 1:1 SITTER PRECAUTIONS.BREATHING EVEN
AND UNLABORED, ON ROOM AIR. ABD SOFT AND ROUND. NO EDEMA NOTED. TOLD BY DAY
NURSE THAT IV RAC IS SALINE LOCKED AND IS COVERED BY BANDAGES TO PREVENT
PATIENT'S SELF-REMOVAL. CALL LIGHT WITHIN REACH, BED PLACED IN LOWEST
POSITION. WILL CONTINUE TO MONITOR.

## 2020-04-12 VITALS — DIASTOLIC BLOOD PRESSURE: 60 MMHG | SYSTOLIC BLOOD PRESSURE: 122 MMHG

## 2020-04-12 VITALS — SYSTOLIC BLOOD PRESSURE: 109 MMHG | DIASTOLIC BLOOD PRESSURE: 63 MMHG

## 2020-04-12 VITALS — DIASTOLIC BLOOD PRESSURE: 56 MMHG | SYSTOLIC BLOOD PRESSURE: 115 MMHG

## 2020-04-12 NOTE — NUR
WITH SITTER AND RN PRESENT, PATIENT HAS ATTEMPTED TO WALK OUT OF THE ROOM BUT
HAS NOT WALKED BEYOND THE NURSES STATION. REORIENTED PATIENT AND BROUGHT HIM
BACK TO HIS ROOM WHERE HE IS COMFORTABLE AND SITTING IN A CHAIR.

## 2020-04-12 NOTE — NUR
PATIENT IS CONFUSED, AGITATED AT TIMES, AND HAS HX OF DEMENTIA. PATIENT IS
AWAKE AT THIS TIME AND EATING BREAKFAST. MEDSURG PATIENT, DENIES CHEST PAIN.
PERIPHERAL PULSES PALPABLE W/ NO SIGNS OF EDEMA. LUNG SOUNDS CTA BILATERALLY,
ON RA, O2 SAT 96%, DENIES SOB. NORMOACTIVEB SX4, ABD ROUND AND SOFT.
INCONTINENT AT TIMES, MILD GENERALIZED WEAKNESS WHILE AMBULATING. SKIN IS
INTACT. DENIES ANY PAIN AT THIS TIME. RAC IV SITE IS CDI. WILL CONTINUE TO
MONITOR. 1:1 SITTER IS PRESENT.

## 2020-04-12 NOTE — NUR
RECEIVED PATIENT FROM DAY NURSE. MED/SURG PATIENT. CONFUSED, UNABLE TO ANSWER
ORIENTATION QUESTIONS. BREATHING EVEN AND UNLABORED, ON ROOM AIR. ABD SOFT AND
ROUND. NO EDEMA NOTED. IV RIGHT AC INTACT. 1:1 SITTER PRECAUTION. CALL LIGHT
WITHIN REACH, BED PLACED IN LOWEST POSITION. WILL CONTINUE TO MONITOR.

## 2020-04-12 NOTE — NUR
DR. GTZ HAS ASSESSED THE PATIENT AND RENEWED THE 5150 HOLD. NO FURTHER
ORDERS AT THIS TIME. WILL CONTINUE TO MONITOR.

## 2020-04-12 NOTE — NUR
Change of shift report was given.  Will continue to assist with bed placement.
 Will keep facility updated with any information as it ocurrs.

## 2020-04-12 NOTE — NUR
Notified [facility] RN at this time there are no beds available , will continue
to look for beds and notify pts RN when bed is found.

## 2020-04-12 NOTE — NUR
PATIENT HAS BEEN SEEN BY DR GTZ. 5150 RENEWED. YARELIS RUTLEDGE PER PHONE, NOTIFIED
OF 5150 RENEWAL.

## 2020-04-12 NOTE — NUR
PATIENT RESTING IN BED, WITH EYES CLOSED. BREATHING EVEN AND UNLABORED. SHOWS
NO SIGN OF DISTRESS OR PAIN. CALL LIGHT WITHIN REACH, BED PLACED IN LOWEST
POSITION. 1:1 SITTER PRECAUTIONS MAINTAINED THROUGHOUT SHIFT.

## 2020-04-12 NOTE — NUR
PATIENT IS CURRENTLY SITTING DOWN IN CHAIR EATING DINNER. PATIENT IS CALM AND
COOPERATIVE AT THIS TIME. WILL CONTINUE TO MONITOR.

## 2020-04-12 NOTE — NUR
PATIENT RESTING IN BED, WITH EYES CLOSED. BREATHING EVEN AND UNLABORED. SHOWS
NO SIGN OF DISTRESS OR PAIN. CALL LIGHT WITHIN REACH. WILL CONTINUE TO
MONITOR.

## 2020-04-13 VITALS — SYSTOLIC BLOOD PRESSURE: 110 MMHG | DIASTOLIC BLOOD PRESSURE: 69 MMHG

## 2020-04-13 VITALS — SYSTOLIC BLOOD PRESSURE: 125 MMHG | DIASTOLIC BLOOD PRESSURE: 75 MMHG

## 2020-04-13 VITALS — SYSTOLIC BLOOD PRESSURE: 124 MMHG | DIASTOLIC BLOOD PRESSURE: 65 MMHG

## 2020-04-13 VITALS — DIASTOLIC BLOOD PRESSURE: 71 MMHG | SYSTOLIC BLOOD PRESSURE: 120 MMHG

## 2020-04-13 VITALS — SYSTOLIC BLOOD PRESSURE: 108 MMHG | DIASTOLIC BLOOD PRESSURE: 66 MMHG

## 2020-04-13 NOTE — NUR
PT ENDORSE TO ME THIS MORNING, LAYING IN BED RESTING A/O CONFUSE AND UNABLE
TO FOLLOW SIMPLE COMMAND OR ANSWER ORIENTATION QUESTIONS, REMAINS ON 5150
HOLD/ 1:1 AT BEDSIDE. BREATHING EVEN AND UNLABORED ON RA/ NO ACUTE RESP
DISTRESS OR SOB NOTED. MEDSURG/ NO SIGN OF CP OR PRESSURE. INCONTINENT AT
TIMES/ URINAL AT BEDSIDE. AMB WITH GEN WEAKNESS AT TIMES. IV TO THE RAC INTACT
AND PATENT/ HEPLOCKED. CALL LIGHT IN REACH. BED IN LOW POSITION. WILL CONTINUE
TO MONITOR.

## 2020-04-13 NOTE — NUR
[facility]. Still no beds available. Stated to call
back around 8 am to see if there are any discharges
[facility]-could not find in system. Not able to assist

## 2020-04-13 NOTE — NUR
PT IS VERY ANXIOUS AND AGITATED, MEDICATED PER EMAR 1ML HALDOL IM/ PT IS NOW
LAYING IN BED RESTING. WILL CONTINUE TO MONITOR.

## 2020-04-13 NOTE — NUR
Packet faxed to the following facilities for review:
[facility]

## 2020-04-13 NOTE — NUR
PATIENT AWAKE AND RESTING AT BEDSIDE. BREATHING EVEN AND UNLABORED. SHOWS NO
SIGN OF DISTRESS OR PAIN. 1:1 SITTER PRECAUTION MAINTAINED. CALL LIGHT WITHIN
REACH, BED PLACED AT LOWEST POSITION. WILL CONTINUE TO MONITOR.

## 2020-04-13 NOTE — NUR
PATIENT RESTING IN BED. BREATHING EVEN AND UNLABORED. IV RFA INTACT. SHOWS NO
SIGN OF PAIN OR DISTRESS. CALL LIGHT WITHIN REACH. 1:1 SITTER PRECAUTION AND
SAFETY PRECAUTIONS MAINTAINED THROUGHOUT SHIFT.

## 2020-04-13 NOTE — NUR
CARE ASSUMED FROM OUTGOING RN. PT RESTING COMFORTABLY IN BED WITH EYES CLOSED.
SITTER AT BEDSIDE. NO ACUTE DISTRESS NOTED. EVEN AND UNLABORED RESPIRATIONS ON
RA. MEDSURG PT. NO IV ACCESS AT THIS TIME, MD AWARE. BED IN LOWEST POSITION.
SIDE RAILS UPX2. CALL LIGHT WITHIN REACH. WILL CONTINUE TO MONITOR.

## 2020-04-14 VITALS — DIASTOLIC BLOOD PRESSURE: 68 MMHG | SYSTOLIC BLOOD PRESSURE: 124 MMHG

## 2020-04-14 VITALS — SYSTOLIC BLOOD PRESSURE: 99 MMHG | DIASTOLIC BLOOD PRESSURE: 53 MMHG

## 2020-04-14 VITALS — SYSTOLIC BLOOD PRESSURE: 120 MMHG | DIASTOLIC BLOOD PRESSURE: 71 MMHG

## 2020-04-14 VITALS — DIASTOLIC BLOOD PRESSURE: 59 MMHG | SYSTOLIC BLOOD PRESSURE: 94 MMHG

## 2020-04-14 NOTE — NUR
S/w [place] to have nurse call [facility] to do nurse to nurse report.
packet fax to [facility]

## 2020-04-14 NOTE — NUR
PT ENDORSE TO ME THIS MORNING, LAYING IN BED RESTING WITH EYES CLOSE, EASLIY
AROUSED/ ALERT TO NAME/ CONFUSE. REMAINS 1:1 5150. BREATHING EVEN AND
UNLABORED ON RA, NO ACUTE RESP DISTRESS NOTED. MEDSURG/ NO SIGN OF CP OR
PRESSURE. BOWEL SOUNDS ACTIVE IN ALL FOUR QUADS, VOIDS USING URINAL AND
INCONTINENT AT TIMES, AMB/ UNSTEADY GAIT AT TIMES. REMAINS WITHOUT NO IV
ACCESS/ NP AWARE. CALL LIGHT IN REACH. BED IN LOW POSITION. BY NURSING
STATION/ WILL CONTINUE TO MONITOR.

## 2020-04-14 NOTE — NUR
Spoke with Ortega at [facility]. Ortega confirmed they are unable to accept pt today
because Pt requires a limited mobility bed and a sitter due to age. He advised
to call back tomorrow to see if a limited mobility bed is available. They will
keep the packet on file

## 2020-04-14 NOTE — NUR
PT RESTING COMFORTABLY IN BED WITH EYES CLOSED. NO ACUTE DISTRESS NOTED. EVEN
AND UNLABORED RESPIRATIONS ON RA. SITTER AT BEDSIDE FOR SAFETY. BED IN LOWEST
POSITION. SIDE RAILS UPX2. CALL LIGHT WITHIN REACH. WILL CONTINUE TO MONITOR.

## 2020-04-14 NOTE — NUR
PT SITTING UP IN BED HAVING LUNCH, 1200 MEDS GIVEN, PT IS CALM AND COOPERATIVE
AT THIS TIME 1:1 AT BEDSIDE. WILL CONTINUE TO MONITOR.

## 2020-04-14 NOTE — NUR
Initial Nutrition Assessment: 250A [address]EN SNEED 74M LR IA
 
Dx: Delirium
PMHx: dementia with unspecified psychosis
PSHx: none noted
Labs: No current pertinent labs
Meds: Aspirin, Depakene, Desyrel, Effexor XR, Flomax, Lipitor, Risperdal
PRN meds: Haldol, Tylenol, Zofran
Diet: Regular
PO intake since admission: 5-100% x 16 meals with average PO intake of 59%.
Ht: 177.8cm/70in Wt: 104.326kg/229.52lbs BMI: 33 Bed scale: not accessible
IBW: 75.45kg/166lbs %IBW: 138%  ABW: 83kg UBW: not able to obtain
Age: 74
Food Allergies: not able to obtain
Edema: none noted
Last BM: 4/12
Skin: intact
Roman: 20
Per H and P (4/6), This patient is a 75 yo male with history of dementia with
unspecified psychosis was brought from [facility] for
evaluation of increased agitation for the past 2 days. Patient is A&O x 1 and
a poor historian. As per ER physician, for the past 3 to 4 days patient has
been increasingly agitated and violent. He was using a chair to knock down his
door and was going to other resident's room. Currently, patient is very
agitated.
 
RD Note (4/14)
Pt was lying in bed and sleeping during bedside visit. Per pt's primary RN, pt
was confused and might not be able to answer questions. Per RN and CNA, pt's
last BM was on the 12th, and pt did not exhibit any GI distress. Per CNA, pt
had good appetite and was able to finish 100% of his meals.
 
Problem with:  N/V/D/C: none per RN
Problems with: Chewing:  Swallowing: None per RN
Current appetite: Good per RN
Recent wt change: not able to obtain %wt change: not able to obtain
Height: not able to obtain
Vitamin/Supplement use: not able to obtain
Special diet at home: not able to obtain
Physical activity: not able to obtain
Nutrition education given (specify specific nutrition education and handout
given): n/a
 
Food-drug interactions? Education given? n/a
 
Estimated Nutritional Needs Based on body weight (83kg)
Energy: 5337-0086 kcal/day (25-30 kcal/kg for geriatric maintenance)
Protein: 83-99 g/day (1-1.2 g/kg for geriatric maintenance)
Fluid: 9391-1132 mL/day (1 mL/kcal)
 
Nutrition Diagnosis:
1. No nutrition diagnosis at this time
 
Intervention
1. Recommend continue regular diet as tolerate
 
Monitor/Evaluate
Goal: PO intake at least 75% of estimated needs
Monitor: PO intake, Labs, GI function
F/U in 7 days as low risk 4/21

## 2020-04-14 NOTE — NUR
PT IS VERY AGGRESSIVE AND AGITATED TRYING TO ATTACK STAFF, MEDICATED PER EMAR
WITH HALOPERIDOL 1ML. WILL ENDORSE TO INCOMING RN.

## 2020-04-14 NOTE — NUR
PT RESTED COMFORTABLY IN INTERVALS THROUGHOUT THE SHIFT. ALL NEEDS TENDED TO
AND MET. ALL SCHEDULED MEDICATIONS GIVEN. SITTER AT BEDSIDE FOR SAFETY. CALM
AND COOPERATIVE AT THIS TIME. BED IN LOWEST POSITION. SIDE RAILS UPX2. CALL
LIGHT WITHIN REACH. WILL ENDORSE TO ONCOMING SHIFT.

## 2020-04-14 NOTE — NUR
RECEIVED PT FROM PREVIOUS SHIFT. PT AWAKE/ DISORIENTED. UNABLE TO FOLLOW
COMMANDS. NO ACUTE DISTRESS. SITTER AT BEDSIDE. CALL LIGHT WITHIN REACH, BED
IN LOW POSITION. WILL CONTINUE TO MONITOR.

## 2020-04-15 VITALS — DIASTOLIC BLOOD PRESSURE: 68 MMHG | SYSTOLIC BLOOD PRESSURE: 110 MMHG

## 2020-04-15 VITALS — DIASTOLIC BLOOD PRESSURE: 72 MMHG | SYSTOLIC BLOOD PRESSURE: 128 MMHG

## 2020-04-15 NOTE — NUR
[facility] day shift to continue actively looking for placement for this pt. Will
followup with potential facilities today. Will contact with any updates. No
openings per night shift report.
 
[facility], will continue to followup

## 2020-04-15 NOTE — NUR
[facility] aware that [facility]150 has been discontinued. We will
discontinue attempts for psych inpatient placement at this time. Should
further assistance be needed, please contact the Call Center at [phone]

## 2020-04-15 NOTE — NUR
PATIENT IS ORIENTED TO NAME, CONFUSED AND FOLLOWS SIMPLE COMMADNS AT TIMES.
MEDSURG PATIENT, DENIES CHSET PAIN. PERIPHERAL PULSES PALPABLE W/ NO SIGNS OF
EDEMA. LUNG SOUNDS CTA BILATERALLY, ON RA, O2 SAT 96%, DENIES SOB. NORMOACTIVE
BSX4. INCONTINENT AT TIMES. GENERALIZED WEAKNESS WHILE AMBULATING. SKIN IS
INTACT. DENIES PAIN OR DISCOMFORT AT THIS TIME. NO IV ACCESS. WILL CONTINUE TO
MONITOR.

## 2020-04-15 NOTE — NUR
PT RESTING IN NO ACUTE DISTRESS. RR EVEN AND UNLABORED. CALL LIGHT WITHIN
REACH, BED IN LOW POSITION. WILL CONTINUE TO MONITOR.

## 2020-04-15 NOTE — NUR
PATIENT IS CURRENTLY LYING SUPINE IN BED. PATIENT HAS WANDERED TO THE RESTROOM
AND TO HIS BED AS STATED BY CNA. TAKES TIME TO REDIRECT PATIENT BACK TO BED.
OTHERWISE, PATIENT IS CALM AT THIS TIME. WILL CONTINUE TO MONITOR.

## 2020-04-15 NOTE — NUR
PT RECIEVED FROM DAY NURSE. PT RESTING IN BED AT THIS TIME. NO S/S OF PAIN AND
DISCOMFORT AT THIS TIME. PT A/O X1, ALERT TO PERSON ONLY. PT MS, DENIES CP,
NV, DIZZINESS, OR PALPATATIONS. PALPABLE PULSES NO EDEMA NOTED AT THIS TIME.
BREATHING E/U ON RA. NO S/S OF SOB. ABD SOFT AND ROUND, DENIES PAIN TO
PALPATION. GENERALIZED WEAKNESS, PT ABLE TO AMBULATE BY SELF. NO IV ACCESS AT
THIS TIME. MD AWARE. SITTER AT BEDSIDE. BED AT LOWEST POSITION. CALL LIGHT
WITHIN REACH. WILL CONTINUE TO MONITOR.

## 2020-04-16 VITALS — SYSTOLIC BLOOD PRESSURE: 114 MMHG | DIASTOLIC BLOOD PRESSURE: 64 MMHG

## 2020-04-16 VITALS — DIASTOLIC BLOOD PRESSURE: 51 MMHG | SYSTOLIC BLOOD PRESSURE: 99 MMHG

## 2020-04-16 VITALS — SYSTOLIC BLOOD PRESSURE: 136 MMHG | DIASTOLIC BLOOD PRESSURE: 76 MMHG

## 2020-04-16 VITALS — SYSTOLIC BLOOD PRESSURE: 122 MMHG | DIASTOLIC BLOOD PRESSURE: 63 MMHG

## 2020-04-16 LAB
ALBUMIN SERPL-MCNC: 3.2 G/DL (ref 3.4–5)
ALP SERPL-CCNC: 79 U/L (ref 46–116)
ALT SERPL-CCNC: 27 U/L (ref 16–63)
AST SERPL-CCNC: 28 U/L (ref 15–37)
BASOPHILS NFR BLD: 0.9 % (ref 0–2)
BILIRUB SERPL-MCNC: 0.4 MG/DL (ref 0.2–1)
BUN SERPL-MCNC: 16 MG/DL (ref 7–18)
CALCIUM SERPL-MCNC: 8.7 MG/DL (ref 8.5–10.1)
CHLORIDE SERPL-SCNC: 102 MMOL/L (ref 98–107)
CO2 SERPL-SCNC: 31.3 MMOL/L (ref 21–32)
CREAT SERPL-MCNC: 1 MG/DL (ref 0.7–1.3)
ERYTHROCYTE [DISTWIDTH] IN BLOOD BY AUTOMATED COUNT: 13 % (ref 11.5–14.5)
GFR SERPLBLD BASED ON 1.73 SQ M-ARVRAT: (no result) ML/MIN
GLUCOSE SERPL-MCNC: 94 MG/DL (ref 74–106)
PLATELET # BLD: 157 X10^3MCL (ref 130–400)
POTASSIUM SERPL-SCNC: 3.9 MMOL/L (ref 3.5–5.1)
PROT SERPL-MCNC: 6.4 G/DL (ref 6.4–8.2)
SODIUM SERPL-SCNC: 141 MMOL/L (ref 136–145)

## 2020-04-16 NOTE — NUR
PATIENT IS CURRENTLY EATING DINNER AT THIS TIME WHILE IN BED. DENIES ANY
INDICATION FOR HALDOL. PATIENT IS CALM AND COOPERATIVE AT TIMES. WILL CONTINUE
TO MONITOR PATIENT'S BEHAVIOR.

## 2020-04-16 NOTE — NUR
PT RESTING IN BED AT THIS TIME. NO S/S OF PAIN AND DISCOMFORT. NO S/S OF ACUTE
DISTRESS NOTED AT THIS TIME. WILL CONTINUE TO MONITOR.

## 2020-04-16 NOTE — NUR
PT RECIEVED FROM DAY NURSE. PT RESTING IN BED AT THIS TIME. DENIES PAIN OR
DISCOMFORT. PT A/O X1, TO SELF ONLY. VERY CONFUSED REQUIRES REDIRECTION. 1:1
SITTER AT BEDSIDE. PT BREATHING E/U ON RA. PT MED/SURG. NO S/S OF CP, NV,
DIZZINESS, OR PALPATATIONS. ABD SOFT AND ROUND, DENIES PAIN TO PALPATION. PT
INCONTINENT, CLEANING PRN. GEN WEAKNESS, PT ABLE TO AMBULATE BY SELF. UNSTEADY
AT TIMES. NO IV ACCESS AT THIS TIME. MD AWARE. BED AT LOWEST POSITION. CALL
LIGHT WITHIN REACH. WILL CONTINUE TO MONITOR.

## 2020-04-16 NOTE — NUR
PATIENT IS CONFUSED, AGITATED AT TIMES, AND REQUIRES REDIRECTION. HOWEVER,
PATIENT REMAINS CALM AND COOPERATIVE AT TIMES. MEDSURG PATIENT, DENIES CHEST
PAIN. PERIPHERAL PULSES PALPABLE W/ NO SIGNS OF EDEMA. LUNG SOUNDS CTA
BILATERLLY, ON RA, 2O SAT 96%, DENIES SOB. NORMAOCTIVE BSX4, BM ON 4/15/20.
VOIDS USING RESTROOM. AMBULATORY WITH GENERALIZED WEAKNESS AND UNSTEADY GAIT.
DOES NOT HAVE IV AND MD IS AWARE. WILL CONTINUE TO MONITOR THE PATIENT'S
BEHAVIOR.

## 2020-04-17 VITALS — SYSTOLIC BLOOD PRESSURE: 120 MMHG | DIASTOLIC BLOOD PRESSURE: 67 MMHG

## 2020-04-17 VITALS — SYSTOLIC BLOOD PRESSURE: 110 MMHG | DIASTOLIC BLOOD PRESSURE: 66 MMHG

## 2020-04-17 VITALS — DIASTOLIC BLOOD PRESSURE: 67 MMHG | SYSTOLIC BLOOD PRESSURE: 120 MMHG

## 2020-04-17 VITALS — DIASTOLIC BLOOD PRESSURE: 76 MMHG | SYSTOLIC BLOOD PRESSURE: 120 MMHG

## 2020-04-17 VITALS — SYSTOLIC BLOOD PRESSURE: 127 MMHG | DIASTOLIC BLOOD PRESSURE: 65 MMHG

## 2020-04-17 LAB
BASOPHILS NFR BLD: 0.6 % (ref 0–2)
BUN SERPL-MCNC: 19 MG/DL (ref 7–18)
CALCIUM SERPL-MCNC: 9 MG/DL (ref 8.5–10.1)
CHLORIDE SERPL-SCNC: 102 MMOL/L (ref 98–107)
CO2 SERPL-SCNC: 31.2 MMOL/L (ref 21–32)
CREAT SERPL-MCNC: 1 MG/DL (ref 0.7–1.3)
ERYTHROCYTE [DISTWIDTH] IN BLOOD BY AUTOMATED COUNT: 13.1 % (ref 11.5–14.5)
GFR SERPLBLD BASED ON 1.73 SQ M-ARVRAT: (no result) ML/MIN
GLUCOSE SERPL-MCNC: 84 MG/DL (ref 74–106)
PLATELET # BLD: 98 X10^3MCL (ref 130–400)
POTASSIUM SERPL-SCNC: 3.7 MMOL/L (ref 3.5–5.1)
SODIUM SERPL-SCNC: 141 MMOL/L (ref 136–145)

## 2020-04-17 NOTE — NUR
I HAVE REVIEWED THE DATA COLLECTION BY LVN (NAME):
ENTERED ON (DATE/TIME):
 
I CONCUR WITH THE DATA AND ANY EXCEPTIONS OR COMMENTS ARE LISTED BELOW:
PATIENT'S PLAN OF CARE WAS DISCUSSED AND REVIEWED WITH LVN: ADDI NICOLAS

## 2020-04-17 NOTE — NUR
PT RESTING IN BED AT THIS TIME. NO S/S OF PAIN OR DISCOMFORT NOTED. BREATHING
E/U. ALL NEEDS AND CONCERNS ADDRESSED THIS SHIFT. NO S/S OF ACUTE DISTRESS
NOTED AT THIS TIME. WILL ENDORSE TO DAY NURSE.

## 2020-04-17 NOTE — NUR
[facility] HERE TO TRANSPORT PATIENT VIA GURNEY BACK TO [place]
ASSISTED LIVING WITH HOSPICE. PER ROBERT MAN SON OLGA LIDIA HOFF HAS BEEN NOTIFIED,
AND FACILITY REP ALSO HAS BEEN NOTIFIED. TRASPORT TEAM GIVEN D/C PACKET AND
BELONGINGS. NO ACUTE DISTRESS NOTED.

## 2020-04-17 NOTE — NUR
PT RESTING IN BED AT THIS TIME, DENIES PAIN OR DISCOMFORT. NO S/S OF ACUTE
DISTRESS NOTED. WILL CONTINUE TO MONITOR.

## 2020-04-17 NOTE — NUR
RECIEVED PATIENT IN BED WITH 1-1 SITTER AT BEDSIDE. PATIENT IS CONFUSED AND
DISORIENTED. NEEDS TO BE REDIRECTED FREQUENTLY. AMBULATES AD RUPAL, UNSTEADY AT
TIMES. NO IV ASSESS. RESP EVEN AND UNLABORED, LUNGS CLIEAR ON ROOM AIR. NO C/O
PAIN OR DISCOMFORT. D/C PLAN BACK TO [place] WITH HOSPICE TODAY.